# Patient Record
Sex: MALE | Race: BLACK OR AFRICAN AMERICAN | NOT HISPANIC OR LATINO | Employment: OTHER | RURAL
[De-identification: names, ages, dates, MRNs, and addresses within clinical notes are randomized per-mention and may not be internally consistent; named-entity substitution may affect disease eponyms.]

---

## 2024-04-11 ENCOUNTER — HOSPITAL ENCOUNTER (EMERGENCY)
Facility: HOSPITAL | Age: 69
Discharge: HOME | End: 2024-04-11
Payer: MEDICARE

## 2024-04-11 VITALS
SYSTOLIC BLOOD PRESSURE: 146 MMHG | HEART RATE: 68 BPM | RESPIRATION RATE: 14 BRPM | WEIGHT: 188 LBS | DIASTOLIC BLOOD PRESSURE: 86 MMHG | TEMPERATURE: 99 F | OXYGEN SATURATION: 100 %

## 2024-04-11 DIAGNOSIS — R07.9 CHEST PAIN: ICD-10-CM

## 2024-04-11 DIAGNOSIS — I50.9 CONGESTIVE HEART FAILURE, UNSPECIFIED HF CHRONICITY, UNSPECIFIED HEART FAILURE TYPE: Primary | ICD-10-CM

## 2024-04-11 LAB
ALBUMIN SERPL BCP-MCNC: 3.6 G/DL (ref 3.5–5)
ALBUMIN/GLOB SERPL: 0.8 {RATIO}
ALP SERPL-CCNC: 66 U/L (ref 45–115)
ALT SERPL W P-5'-P-CCNC: 32 U/L (ref 16–61)
ANION GAP SERPL CALCULATED.3IONS-SCNC: 9 MMOL/L (ref 7–16)
AST SERPL W P-5'-P-CCNC: 28 U/L (ref 15–37)
BASOPHILS # BLD AUTO: 0.06 K/UL (ref 0–0.2)
BASOPHILS NFR BLD AUTO: 0.7 % (ref 0–1)
BILIRUB SERPL-MCNC: 0.5 MG/DL (ref ?–1.2)
BUN SERPL-MCNC: 12 MG/DL (ref 7–18)
BUN/CREAT SERPL: 9 (ref 6–20)
CALCIUM SERPL-MCNC: 9.2 MG/DL (ref 8.5–10.1)
CHLORIDE SERPL-SCNC: 105 MMOL/L (ref 98–107)
CO2 SERPL-SCNC: 31 MMOL/L (ref 21–32)
CREAT SERPL-MCNC: 1.27 MG/DL (ref 0.7–1.3)
DIFFERENTIAL METHOD BLD: ABNORMAL
EGFR (NO RACE VARIABLE) (RUSH/TITUS): 62 ML/MIN/1.73M2
EOSINOPHIL # BLD AUTO: 0.19 K/UL (ref 0–0.5)
EOSINOPHIL NFR BLD AUTO: 2.3 % (ref 1–4)
ERYTHROCYTE [DISTWIDTH] IN BLOOD BY AUTOMATED COUNT: 13.9 % (ref 11.5–14.5)
GLOBULIN SER-MCNC: 4.5 G/DL (ref 2–4)
GLUCOSE SERPL-MCNC: 83 MG/DL (ref 74–106)
HCT VFR BLD AUTO: 50.2 % (ref 40–54)
HGB BLD-MCNC: 16.7 G/DL (ref 13.5–18)
IMM GRANULOCYTES # BLD AUTO: 0.03 K/UL (ref 0–0.04)
IMM GRANULOCYTES NFR BLD: 0.4 % (ref 0–0.4)
LYMPHOCYTES # BLD AUTO: 1.75 K/UL (ref 1–4.8)
LYMPHOCYTES NFR BLD AUTO: 21.1 % (ref 27–41)
MCH RBC QN AUTO: 31.1 PG (ref 27–31)
MCHC RBC AUTO-ENTMCNC: 33.3 G/DL (ref 32–36)
MCV RBC AUTO: 93.5 FL (ref 80–96)
MONOCYTES # BLD AUTO: 0.57 K/UL (ref 0–0.8)
MONOCYTES NFR BLD AUTO: 6.9 % (ref 2–6)
MPC BLD CALC-MCNC: 11.3 FL (ref 9.4–12.4)
NEUTROPHILS # BLD AUTO: 5.69 K/UL (ref 1.8–7.7)
NEUTROPHILS NFR BLD AUTO: 68.6 % (ref 53–65)
NRBC # BLD AUTO: 0 X10E3/UL
NRBC, AUTO (.00): 0 %
NT-PROBNP SERPL-MCNC: 200 PG/ML (ref 1–125)
PLATELET # BLD AUTO: 212 K/UL (ref 150–400)
POTASSIUM SERPL-SCNC: 3.7 MMOL/L (ref 3.5–5.1)
PROT SERPL-MCNC: 8.1 G/DL (ref 6.4–8.2)
RBC # BLD AUTO: 5.37 M/UL (ref 4.6–6.2)
SODIUM SERPL-SCNC: 141 MMOL/L (ref 136–145)
TROPONIN I SERPL DL<=0.01 NG/ML-MCNC: 14 PG/ML
TROPONIN I SERPL DL<=0.01 NG/ML-MCNC: 18.7 PG/ML
WBC # BLD AUTO: 8.29 K/UL (ref 4.5–11)

## 2024-04-11 PROCEDURE — 25000003 PHARM REV CODE 250: Performed by: NURSE PRACTITIONER

## 2024-04-11 PROCEDURE — 99284 EMERGENCY DEPT VISIT MOD MDM: CPT | Mod: ,,, | Performed by: NURSE PRACTITIONER

## 2024-04-11 PROCEDURE — 83880 ASSAY OF NATRIURETIC PEPTIDE: CPT | Performed by: NURSE PRACTITIONER

## 2024-04-11 PROCEDURE — 93010 ELECTROCARDIOGRAM REPORT: CPT | Mod: ,,, | Performed by: HOSPITALIST

## 2024-04-11 PROCEDURE — 85025 COMPLETE CBC W/AUTO DIFF WBC: CPT | Performed by: NURSE PRACTITIONER

## 2024-04-11 PROCEDURE — 84484 ASSAY OF TROPONIN QUANT: CPT | Mod: 91 | Performed by: NURSE PRACTITIONER

## 2024-04-11 PROCEDURE — 93005 ELECTROCARDIOGRAM TRACING: CPT

## 2024-04-11 PROCEDURE — 99285 EMERGENCY DEPT VISIT HI MDM: CPT | Mod: 25

## 2024-04-11 PROCEDURE — 80053 COMPREHEN METABOLIC PANEL: CPT | Performed by: NURSE PRACTITIONER

## 2024-04-11 RX ORDER — FUROSEMIDE 20 MG/1
20 TABLET ORAL DAILY
Qty: 30 TABLET | Refills: 0 | Status: SHIPPED | OUTPATIENT
Start: 2024-04-11 | End: 2024-04-12

## 2024-04-11 RX ORDER — ASPIRIN 325 MG
325 TABLET ORAL
Status: COMPLETED | OUTPATIENT
Start: 2024-04-11 | End: 2024-04-11

## 2024-04-11 RX ORDER — NAPROXEN SODIUM 220 MG/1
81 TABLET, FILM COATED ORAL DAILY
Qty: 30 TABLET | Refills: 0 | Status: SHIPPED | OUTPATIENT
Start: 2024-04-11 | End: 2024-04-12

## 2024-04-11 RX ADMIN — ASPIRIN 325 MG ORAL TABLET 325 MG: 325 PILL ORAL at 01:04

## 2024-04-11 NOTE — ED PROVIDER NOTES
Encounter Date: 4/11/2024       History     Chief Complaint   Patient presents with    Back Pain     Upper back pain that radiates to his chest. Has been coughing for about 4-5 months.     Cough     68 year old male presents to ED with complaint of chest pain and back pain. Patient reports symptoms started on yesterday with continuation/worsening of symptoms on today. He states pain starts in the middle of his back and radiates to his chest. Reports pain with ROM from neck to chest. He states pain is currently 7 on 0-10 scale with pain highest at 8. Patient also reports cough for 4-5 months productive of white thick sputum. He also states shortness of breath with reported history of CHF. [facility]; does not have a Cardiologist. Denies fever, chills, abdominal pain, nausea/vomiting, diarrhea.     The history is provided by the patient and medical records.     Review of patient's allergies indicates:   Allergen Reactions    Milk of magnesia [magnesium hydroxide] Rash     Past Medical History:   Diagnosis Date    CHF (congestive heart failure)     Hypertension      No past surgical history on file.  No family history on file.  Social History     Tobacco Use    Smoking status: Never    Smokeless tobacco: Never   Substance Use Topics    Alcohol use: Yes    Drug use: Never     Review of Systems   Constitutional:  Negative for chills and fever.   Respiratory:  Positive for cough and shortness of breath.    Cardiovascular:  Positive for chest pain. Negative for palpitations.   Gastrointestinal:  Negative for diarrhea, nausea and vomiting.   Musculoskeletal:  Positive for back pain. Negative for neck stiffness.       Physical Exam     Initial Vitals [04/11/24 1227]   BP Pulse Resp Temp SpO2   (!) 160/84 65 18 98.6 °F (37 °C) 99 %      MAP       --         Physical Exam    Nursing note and vitals reviewed.  Constitutional: He appears well-developed and well-nourished.   HENT:   Head:  Normocephalic and atraumatic.   Eyes: EOM are normal. Pupils are equal, round, and reactive to light.   Neck: Neck supple.   Normal range of motion.  Cardiovascular:  Normal rate and regular rhythm.           No murmur heard.  Pulmonary/Chest: He has no wheezes. He has no rhonchi.   Abdominal: Abdomen is soft. He exhibits no distension. There is no abdominal tenderness.   Musculoskeletal:         General: Edema present. No tenderness.      Cervical back: Normal range of motion and neck supple.     Lymphadenopathy:     He has no cervical adenopathy.   Neurological: He is alert and oriented to person, place, and time. No cranial nerve deficit or sensory deficit.   Skin: Skin is warm and dry. Capillary refill takes less than 2 seconds.   Psychiatric: He has a normal mood and affect. Thought content normal.         Medical Screening Exam   See Full Note    ED Course   Procedures  Labs Reviewed   COMPREHENSIVE METABOLIC PANEL - Abnormal; Notable for the following components:       Result Value    Globulin 4.5 (*)     All other components within normal limits   NT-PRO NATRIURETIC PEPTIDE - Abnormal; Notable for the following components:    ProBNP 200 (*)     All other components within normal limits   CBC WITH DIFFERENTIAL - Abnormal; Notable for the following components:    MCH 31.1 (*)     Neutrophils % 68.6 (*)     Lymphocytes % 21.1 (*)     Monocytes % 6.9 (*)     All other components within normal limits   TROPONIN I - Normal   TROPONIN I - Normal   CBC W/ AUTO DIFFERENTIAL    Narrative:     The following orders were created for panel order CBC auto differential.  Procedure                               Abnormality         Status                     ---------                               -----------         ------                     CBC with Differential[[id number]]       Abnormal            Final result                 Please view results for these tests on the individual orders.          Imaging Results               X-Ray Chest PA And Lateral (Final result)  Result time 04/11/24 13:06:19      Final result by Carlos Palomino DO (04/11/24 13:06:19)                   Impression:      Mild interstitial edema.    Normal pleura.    Cardiac silhouette is mild enlarged      Electronically signed by: Carlos Palomino  Date:    04/11/2024  Time:    13:06               Narrative:    EXAMINATION:  XR CHEST PA AND LATERAL    CLINICAL HISTORY:  Chest Pain;    TECHNIQUE:  XR CHEST PA AND LATERAL    COMPARISON:  None    FINDINGS:  No lines or tubes.    Mild interstitial edema.    Normal pleura.    Cardiac silhouette is mild enlarged    No obvious acute bone findings.                                       Medications   aspirin tablet 325 mg (325 mg Oral Given 4/11/24 1303)     Medical Decision Making  68 year old male presents to ED with complaint of chest pain and back pain. Patient reports symptoms started on yesterday with continuation/worsening of symptoms on today. He states pain starts in the middle of his back and radiates to his chest. Reports pain with ROM from neck to chest. He states pain is currently 7 on 0-10 scale with pain highest at 8. Patient also reports cough for 4-5 months productive of white thick sputum. He also states shortness of breath with reported history of CHF. [facility]; does not have a Cardiologist. Denies fever, chills, abdominal pain, nausea/vomiting, diarrhea.     Labs, diagnostics obtained. PO ASA administered. Prescriptions provided. Cardiology referral placed    Amount and/or Complexity of Data Reviewed  Labs: ordered.     Details: Globulin 4.5 (*)  All other components within normal limits  NT-PRO NATRIURETIC PEPTIDE - Abnormal; Notable for the following components:  ProBNP 200 (*)  All other components within normal limits  CBC WITH DIFFERENTIAL - Abnormal; Notable for the following components:  MCH 31.1 (*)  Neutrophils % 68.6 (*)  Lymphocytes % 21.1 (*)  Monocytes  % 6.9 (*)    Radiology: ordered.     Details: Mild interstitial edema.    Normal pleura.    Cardiac silhouette is mild enlarged  ECG/medicine tests: ordered.     Details: NSR; low voltage QRS    Risk  OTC drugs.  Prescription drug management.                                      Clinical Impression:   Final diagnoses:  [R07.9] Chest pain  [I50.9] Congestive heart failure, unspecified HF chronicity, unspecified heart failure type (Primary)        ED Disposition Condition    Discharge Stable          ED Prescriptions       Medication Sig Dispense Start Date End Date Auth. Provider    furosemide (LASIX) 20 MG tablet Take 1 tablet (20 mg total) by mouth once daily. 30 tablet 4/11/2024 4/11/2025 Nargis Head FNP    aspirin 81 MG Chew Take 1 tablet (81 mg total) by mouth once daily. 30 tablet 4/11/2024 4/11/2025 Nargis Head FNP          Follow-up Information    None          Nargis Head FNP  04/11/24 4618

## 2024-04-12 RX ORDER — NAPROXEN SODIUM 220 MG/1
81 TABLET, FILM COATED ORAL DAILY
Qty: 30 TABLET | Refills: 0 | Status: SHIPPED | OUTPATIENT
Start: 2024-04-12 | End: 2025-04-12

## 2024-04-12 RX ORDER — FUROSEMIDE 20 MG/1
20 TABLET ORAL DAILY
Qty: 30 TABLET | Refills: 0 | Status: SHIPPED | OUTPATIENT
Start: 2024-04-12 | End: 2025-04-12

## 2024-04-14 LAB
OHS QRS DURATION: 94 MS
OHS QTC CALCULATION: 413 MS